# Patient Record
(demographics unavailable — no encounter records)

---

## 2024-11-04 NOTE — DISCUSSION/SUMMARY
[FreeTextEntry1] : 3 y/o M w/ presentation most consistent with acute URI. Erythematous oropharynx noted on exam, will evaluate for strep throat.  Plan: 1. Rapid strep (negative); throat culture 2. Supportive care with Tylenol/Motrin PRN, increased fluids, keeping head elevated and rest  3. Monitor and return with any new or worsening symptoms.

## 2024-11-04 NOTE — HISTORY OF PRESENT ILLNESS
[de-identified] : cough, congestion [FreeTextEntry6] : 3 y/o M present with cough and congestion x3-4 days. Denies any fever, SOB or change in appetite.

## 2024-12-21 NOTE — HISTORY OF PRESENT ILLNESS
[de-identified] : DIAPER RASH [FreeTextEntry6] : s/p more frequent and larger mass of stools w/increased PO intake appears to worsen after wet / BM diaper usually resolves w/aquaphor, and improves partly but not completely very itchy, averse to wipiing

## 2025-04-26 NOTE — DEVELOPMENTAL MILESTONES
[Normal Development] : Normal Development [None] : none [Plays and shares with others] : plays and shares with others [Begins to play make-believe] : begins to play make-believe [Eats independently] : eats independently [Uses 3-word sentences] : uses 3-word sentences [Tells a story from a book or TV] : tells a story from a book or TV [Climbs on and off couch] : climbs on and off couch or chair [Jumps forward] : jumps forward [Draws a single [place]] : draws a single [place] [Goes to the bathroom and urinates] : does not go to bathroom and urinates by self [Uses words that are 75% intelligible] : does not use words that are 75% intelligible to strangers [FreeTextEntry1] : Improving Language skills

## 2025-04-26 NOTE — HISTORY OF PRESENT ILLNESS
[Parents] : parents [Normal] : Normal [Brushing teeth] : Brushing teeth [Appropiate parent-child communication] : Appropriate parent-child communication [Child Cooperates] : Child cooperates [No] : No cigarette smoke exposure [Car seat in back seat] : Car seat in back seat [Smoke Detectors] : Smoke detectors [Supervised play near cars and streets] : Supervised play near cars and streets [Carbon Monoxide Detectors] : Carbon monoxide detectors [Up to date] : Up to date [NO] : No [de-identified] : chicken, brocolili, french fries....mostly water.  [FreeTextEntry8] : not yet [employer] trained. ...indicating when he has/had [FreeTextEntry9] : HOME - starting 3k ....

## 2025-04-26 NOTE — DISCUSSION/SUMMARY
[FreeTextEntry1] : 3 yo boy here for [facility].   WCC - Appropriate growth & Developmentally appropriate for age - Continue balanced diet with all food groups. - Brush teeth twice a day with toothbrush. Recommend visit to dentist at least yearly. - Help child to maintain consistent daily routines and sleep schedule.  - School discussed. Ensure home is safe. Teach child about personal safety. Use consistent, positive discipline.  - Limit screen time to no more than 2 hours per day.  - Encourage physical activity.  - Vaccines : Hep A - Return in fall for flu shot - Return 1 year for routine well child check.

## 2025-04-26 NOTE — PHYSICAL EXAM

## 2025-04-26 NOTE — DEVELOPMENTAL MILESTONES
[Normal Development] : Normal Development [None] : none [Plays and shares with others] : plays and shares with others [Begins to play make-believe] : begins to play make-believe [Eats independently] : eats independently [Uses 3-word sentences] : uses 3-word sentences [Tells a story from a book or TV] : tells a story from a book or TV [Climbs on and off couch] : climbs on and off couch or chair [Jumps forward] : jumps forward [Draws a single [place]] : draws a single [place] [Goes to the bathroom and urinates] : does not go to bathroom and urinates by self [Uses words that are 75% intelligible] : does not use words that are 75% intelligible to strangers [FreeTextEntry1] : Improving Language skills

## 2025-04-26 NOTE — DISCUSSION/SUMMARY
[FreeTextEntry1] : 3 yo boy here for [facility].   WCC - Appropriate growth & Developmentally appropriate for age - Continue balanced diet with all food groups. - Brush teeth twice a day with toothbrush. Recommend visit to dentist at least yearly. - Help child to maintain consistent daily routines and sleep schedule.  - School discussed. Ensure home is safe. Teach child about personal safety. Use consistent, positive discipline.  - Limit screen time to no more than 2 hours per day.  - Encourage physical activity.  - Vaccines : Hep A - Return in fall for flu shot - Return 1 year for routine well child check.

## 2025-04-26 NOTE — HISTORY OF PRESENT ILLNESS
[Parents] : parents [Normal] : Normal [Brushing teeth] : Brushing teeth [Appropiate parent-child communication] : Appropriate parent-child communication [Child Cooperates] : Child cooperates [No] : No cigarette smoke exposure [Car seat in back seat] : Car seat in back seat [Smoke Detectors] : Smoke detectors [Supervised play near cars and streets] : Supervised play near cars and streets [Carbon Monoxide Detectors] : Carbon monoxide detectors [Up to date] : Up to date [NO] : No [de-identified] : chicken, brocolili, french fries....mostly water.  [FreeTextEntry8] : not yet [employer] trained. ...indicating when he has/had [FreeTextEntry9] : HOME - starting 3k ....